# Patient Record
Sex: MALE | Race: WHITE | Employment: FULL TIME | URBAN - METROPOLITAN AREA
[De-identification: names, ages, dates, MRNs, and addresses within clinical notes are randomized per-mention and may not be internally consistent; named-entity substitution may affect disease eponyms.]

---

## 2018-05-10 ENCOUNTER — TELEPHONE (OUTPATIENT)
Dept: SLEEP MEDICINE | Age: 33
End: 2018-05-10

## 2018-05-10 DIAGNOSIS — G47.33 OBSTRUCTIVE SLEEP APNEA (ADULT) (PEDIATRIC): Primary | ICD-10-CM

## 2018-05-10 NOTE — TELEPHONE ENCOUNTER
STUDY ORDER CONFIRMATION  Bartolo Rob 1985      Type of Study Requested: Direct Referral for Study - Please arrange a sleep study consult only if sleep study is positive. Referring Physician: Dr. Florecita Cobb    Date of Study: 07/15/2018  Spoke with: patient          Height: 5f10  Weight: 230lbs  (over 499 lb can only be done at [facility])  BMI:       Snoring      yes    Excessive Daytime Sleepiness   no    Witnessed Apnea     yes    Hypertension      no    Cardiovascular disease (CHF-Heart Failure)  no    COPD or Emphysema?    no  On Oxygen? lpm Day/Night   no    Restless Leg Symptoms-   Do you experience an uncomfortable sensation in your legs   especially at night?    no   Kicking?     no   Twitching?     no    Do you experience insomnia?   no  Sleep talking/walking?     no  Do you ever wake with a rapid heart rate?  no  Unusual movements in sleep (violent, flailing limbs? )no  Night Terrors?      no  Sleep Paralysis or Sleep Hallucinations:  (while waking from sleep or dream, you cannot move) no  Do you/have you had seizures?   no  Have you had a Stroke, CVA or TIA?   no       When? Do you take any medications for the following? Pain       no  Anxiety       yes  Sleep       no               Have you been in hospital?    no   Has it been at least 72 hrs. since discharge? no   Discharge Date   (If not at least 72 hrs, cannot see pt. for study)    Are you currently on an antibiotic?   no  If yes, for what reason? Do you need anything from us for your employer? no    Are you a CDL, DOT or other Certified ? no     Have you had a sleep study before?   no  If yes, when and where? Are you currently using CPAP?   no  If yes, what is the setting? Do you have any special needs?   Wheelchair      no  Help to and from the bathroom   no  Other?      no    (If yes to any special needs a care giver may need to come with the patient and stay the night.)    Will someone need to accompany you on the night of your study? (Primarily for parents of minors, patients with special needs, elderly, long distance travel). Other family,  may stay until study begins. \"We want to be sure to take the best care of you. Are there Cultural or Spiritual needs that we should be aware of or are there any concerns that I can address for you?        no    If yes, describe:      Attach Medication List    If yes to any \"*\" or special needs, discuss with the Lead Tech    Notes:     Study date:07/15/2018  Time:8:30pm Location:[facility]      [facility] [place]    Date: 05/10/2018

## 2018-05-16 NOTE — TELEPHONE ENCOUNTER
STUDY ORDER CONFIRMATION    Study Indication:   G47.33  Study: Diagnostic polysomnogram - CPT 45178: Split Study if patient meets the criteria.   Study Instructions / additional orders:

## 2018-07-15 ENCOUNTER — HOSPITAL ENCOUNTER (OUTPATIENT)
Dept: SLEEP MEDICINE | Age: 33
Discharge: HOME | End: 2018-07-15
Payer: COMMERCIAL

## 2018-07-15 DIAGNOSIS — G47.33 OBSTRUCTIVE SLEEP APNEA (ADULT) (PEDIATRIC): ICD-10-CM

## 2018-07-15 PROCEDURE — 95810 POLYSOM 6/> YRS 4/> PARAM: CPT | Performed by: INTERNAL MEDICINE

## 2018-07-17 ENCOUNTER — TELEPHONE (OUTPATIENT)
Dept: SLEEP MEDICINE | Age: 33
End: 2018-07-17

## 2018-07-19 ENCOUNTER — DOCUMENTATION ONLY (OUTPATIENT)
Dept: SLEEP MEDICINE | Age: 33
End: 2018-07-19

## 2018-07-30 ENCOUNTER — OFFICE VISIT (OUTPATIENT)
Dept: SLEEP MEDICINE | Age: 33
End: 2018-07-30

## 2018-07-30 VITALS
WEIGHT: 254 LBS | HEART RATE: 76 BPM | SYSTOLIC BLOOD PRESSURE: 123 MMHG | HEIGHT: 70 IN | OXYGEN SATURATION: 98 % | DIASTOLIC BLOOD PRESSURE: 76 MMHG | BODY MASS INDEX: 36.36 KG/M2

## 2018-07-30 DIAGNOSIS — E66.01 MORBID OBESITY (HCC): ICD-10-CM

## 2018-07-30 DIAGNOSIS — G47.00 INSOMNIA, UNSPECIFIED TYPE: Primary | ICD-10-CM

## 2018-07-30 DIAGNOSIS — R06.83 PRIMARY SNORING: ICD-10-CM

## 2018-07-30 PROBLEM — F99 PSYCHIATRIC DISORDER: Status: ACTIVE | Noted: 2018-07-30

## 2018-07-30 RX ORDER — VENLAFAXINE HYDROCHLORIDE 37.5 MG/1
CAPSULE, EXTENDED RELEASE ORAL DAILY
COMMUNITY

## 2018-07-30 NOTE — PATIENT INSTRUCTIONS
[address] [place]. [place], [address]  Tel.  [phone]  Fax. [address] [facility] [address]  [place], [address] [facility]  Tel.  [phone]  Fax. [phone] [address]  Tel.  [phone]  Fax. [phone]     Sleep Apnea: After Your Visit  Your Care Instructions  Sleep apnea occurs when you frequently stop breathing for 10 seconds or longer during sleep. It can be mild to severe, based on the number of times per hour that you stop breathing or have slowed breathing. Blocked or narrowed airways in your nose, mouth, or throat can cause sleep apnea. Your airway can become blocked when your throat muscles and tongue relax during sleep. Sleep apnea is common, occurring in 1 out of 20 individuals. Individuals having any of the following characteristics should be evaluated and treated right away due to high risk and detrimental consequences from untreated sleep apnea:  1. Obesity  2. Congestive Heart failure  3. Atrial Fibrillation  4. Uncontrolled Hypertension  5. Type II Diabetes  6. Night-time Arrhythmias  7. Stroke  8. Pulmonary Hypertension  9. High-risk Driving Populations (pilots, truck drivers, etc.)  10. Patients Considering Weight-loss Surgery    How do you know you have sleep apnea? You probably have sleep apnea if you answer 'yes' to 3 or more of the following questions:  S - Have you been told that you Snore? T - Are you often Tired during the day? O - Has anyone Observed you stop breathing while sleeping? P- Do you have (or are being treated for) high blood Pressure? B - Are you obese (Body Mass Index > 35)? A - Is your Age 48years old or older? N - Is your Neck size greater than 16 inches? G - Are you male Gender? A sleep physician can prescribe a breathing device that prevents tissues in the throat from blocking your airway.  Or your doctor may recommend using a dental device (oral breathing device) to help keep your airway open. In some cases, surgery may be needed to remove enlarged tissues in the throat. Follow-up care is a key part of your treatment and safety. Be sure to make and go to all appointments, and call your doctor if you are having problems. It's also a good idea to know your test results and keep a list of the medicines you take. How can you care for yourself at home? · Lose weight, if needed. It may reduce the number of times you stop breathing or have slowed breathing. · Go to bed at the same time every night. · Sleep on your side. It may stop mild apnea. If you tend to roll onto your back, sew a pocket in the back of your pajama top. Put a tennis ball into the pocket, and stitch the pocket shut. This will help keep you from sleeping on your back. · Avoid alcohol and medicines such as sleeping pills and sedatives before bed. · Do not smoke. Smoking can make sleep apnea worse. If you need help quitting, talk to your doctor about stop-smoking programs and medicines. These can increase your chances of quitting for good. · Prop up the head of your bed 4 to 6 inches by putting bricks under the legs of the bed. · Treat breathing problems, such as a stuffy nose, caused by a cold or allergies. · Use a continuous positive airway pressure (CPAP) breathing machine if lifestyle changes do not help your apnea and your doctor recommends it. The machine keeps your airway from closing when you sleep. · If CPAP does not help you, ask your doctor whether you should try other breathing machines. A bilevel positive airway pressure machine has two types of air pressureâone for breathing in and one for breathing out. Another device raises or lowers air pressure as needed while you breathe. · If your nose feels dry or bleeds when using one of these machines, talk with your doctor about increasing moisture in the air. A humidifier may help.   · If your nose is runny or stuffy from using a breathing machine, talk with your doctor about using decongestants or a corticosteroid nasal spray. When should you call for help? Watch closely for changes in your health, and be sure to contact your doctor if:  · You still have sleep apnea even though you have made lifestyle changes. · You are thinking of trying a device such as CPAP. · You are having problems using a CPAP or similar machine. Where can you learn more? Go to [employer]. Enter H813 in the search box to learn more about \"Sleep Apnea: After Your Visit. \"   © 9855-9242 Healthwise, Incorporated. Care instructions adapted under license by [employer] (which disclaims liability or warranty for this information). This care instruction is for use with your licensed healthcare professional. If you have questions about a medical condition or this instruction, always ask your healthcare professional. Jon[employer] Rise any warranty or liability for your use of this information. PROPER SLEEP HYGIENE    What to avoid  · Do not have drinks with caffeine, such as coffee or black tea, for 8 hours before bed. · Do not smoke or use other types of tobacco near bedtime. Nicotine is a stimulant and can keep you awake. · Avoid drinking alcohol late in the evening, because it can cause you to wake in the middle of the night. · Do not eat a big meal close to bedtime. If you are hungry, eat a light snack. · Do not drink a lot of water close to bedtime, because the need to urinate may wake you up during the night. · Do not read or watch TV in bed. Use the bed only for sleeping and sexual activity. What to try  · Go to bed at the same time every night, and wake up at the same time every morning. Do not take naps during the day. · Keep your bedroom quiet, dark, and cool. · Get regular exercise, but not within 3 to 4 hours of your bedtime. .  · Sleep on a comfortable pillow and mattress.   · If watching the clock makes you anxious, turn it facing away from you so you cannot see the time. · If you worry when you lie down, start a worry book. Well before bedtime, write down your worries, and then set the book and your concerns aside. · Try meditation or other relaxation techniques before you go to bed. · If you cannot fall asleep, get up and go to another room until you feel sleepy. Do something relaxing. Repeat your bedtime routine before you go to bed again. · Make your house quiet and calm about an hour before bedtime. Turn down the lights, turn off the TV, log off the computer, and turn down the volume on music. This can help you relax after a busy day. Drowsy Driving  The [address] [place] [address] Road Traffic Safety Administration cites drowsiness as a causing factor in more than 798,767 police reported crashes annually, resulting in 76,000 injuries and 1,500 deaths. Other surveys suggest 55% of people polled have driven while drowsy in the past year, 23% had fallen asleep but not crashed, 3% crashed, and 2% had and accident due to drowsy driving. Who is at risk? Young Drivers: One study of drowsy driving accidents states that 55% of the drivers were under 25 years. Of those, 75% were male. Shift Workers and Travelers: People who work overnight or travel across time zones frequently are at higher risk of experiencing Circadian Rhythm Disorders. They are trying to work and function when their body is programed to sleep. Sleep Deprived: Lack of sleep has a serious impact on your ability to pay attention or focus on a task. Consistently getting less than the average of 8 hours your body needs creates partial or cumulative sleep deprivation. Untreated Sleep Disorders: Sleep Apnea, Narcolepsy, R.L.S., and other sleep disorders (untreated) prevent a person from getting enough restful sleep. This leads to excessive daytime sleepiness and increases the risk for drowsy driving accidents by up to 7 times.   Medications / Alcohol: Even over the counter medications can cause drowsiness. Medications that impair a drivers attention should have a warning label. Alcohol naturally makes you sleepy and on its own can cause accidents. Combined with excessive drowsiness its effects are amplified. Signs of Drowsy Driving:   * You don't remember driving the last few miles   * You may drift out of your hien   * You are unable to focus and your thoughts wander   * You may yawn more often than normal   * You have difficulty keeping your eyes open / nodding off   * Missing traffic signs, speeding, or tailgating  Prevention-   Good sleep hygiene, lifestyle and behavioral choices have the most impact on drowsy driving. There is no substitute for sleep and the average person requires 8 hours nightly. If you find yourself driving drowsy, stop and sleep. Consider the sleep hygiene tips provided during your visit as well. Medication Refill Policy: Refills for all medications require 1 week advance notice. Please have your pharmacy fax a refill request. We are unable to fax, or call in \"controled substance\" medications and you will need to pick these prescriptions up from our office. Ezakus Activation    Thank you for requesting access to Ezakus. Please follow the instructions below to securely access and download your online medical record. Ezakus allows you to send messages to your doctor, view your test results, renew your prescriptions, schedule appointments, and more. How Do I Sign Up? 1. In your internet browser, go to [email or web address]. 2. Click on the First Time User? Click Here link in the Sign In box. You will see the New Member Sign Up page. 3. Enter your Ezakus Access Code exactly as it appears below. You will not need to use this code after youve completed the sign-up process. If you do not sign up before the expiration date, you must request a new code.     Ezakus Access Code: [email or web address]  Expires: 10/11/2018  9:37 AM (This is the date your OneSun access code will )    4. Enter the last four digits of your Social Security Number (xxxx) and Date of Birth (mm/dd/yyyy) as indicated and click Submit. You will be taken to the next sign-up page. 5. Create a [employer] ID. This will be your OneSun login ID and cannot be changed, so think of one that is secure and easy to remember. 6. Create a OneSun password. You can change your password at any time. 7. Enter your Password Reset Question and Answer. This can be used at a later time if you forget your password. 8. Enter your e-mail address. You will receive e-mail notification when new information is available in [address] 9. Click Sign Up. You can now view and download portions of your medical record. 10. Click the Download Summary menu link to download a portable copy of your medical information. Additional Information    If you have questions, please call [phone]. Remember, OneSun is NOT to be used for urgent needs. For medical emergencies, dial 911.

## 2018-07-30 NOTE — PROGRESS NOTES
[address] [place], [address]  Tel.  [phone]  Fax. 100 [facility] [address]  [place], [address] [facility]  Tel.  [phone]  Fax. [phone] [address] [facility] [address]  Tel.  [phone]  Fax. [phone]         Subjective:      Regino Hernandez is an 26 y.o. male referred by Dr. Rod Epps,  for evaluation for a sleep disorder. He complains of snoring, snorting and daytime sleepiness associated with he has been banished to the couch due to his snoring. Symptoms began several years ago, gradually worsening since that time. He usually can fall asleep in 5 minutes. Family or house members note snoring, snorting. He denies falling asleep while at work, driving. Regino Hernandez does wake up frequently at night. He is bothered by waking up too early and left unable to get back to sleep. He actually sleeps about 6 hours at night and wakes up about 6 times during the night. He does not work shifts: Matthew Crawford indicates he does get too little sleep at night. His bedtime is 2200. He awakens at 0500. He does take naps. He takes 1 naps a week lasting 30 to 45, Minute(s). He has the following observed behaviors: Light snoring, Loud snoring, Pauses in breathing; Nightmares. Other remarks: vivid dreams and waking with gasp  At one time he had lost 40 pounds and felt better. He has young twin girls. Significant other bothered by snoring. He tends to fall asleep with tv on   Polysomnogram was performed and the results of the study were explained to the patient. Please refer to interpretation report for further details. Apnea/Hypopnea index of 3 which indicates no significant apnea. He continues to have snoring, excessive daytime sleepiness. [place] Sleepiness Score: 9      Not on File      Current Outpatient Prescriptions:     venlafaxine-SR (EFFEXOR XR) 37.5 mg capsule, Take  by mouth daily. , Disp: , Rfl:      He  has a past medical history of Morbid obesity ([address] and Psychiatric disorder. He  has no past surgical history on file. He family history includes No Known Problems in his father and mother. He  reports that he has never smoked. He has never used smokeless tobacco. He reports that he does not drink alcohol or use illicit drugs. Review of Systems:  Constitutional:  60 pound weight gain. Eyes:  No blurred vision. CVS:  No significant chest pain  Pulm:  No significant shortness of breath  GI:  No significant nausea or vomiting  :  No significant nocturia  Musculoskeletal:  No significant joint pain at night  Skin:  No significant rashes  Neuro:  No significant dizziness   Psych:  Treated for depression and anxiety. He is currently weaning off the effexor with his doctor. Sleep Review of Systems: notable for no difficulty falling asleep; infrequent awakenings at night;  regular dreaming noted; no nightmares ; no early morning headaches; no memory problems; no concentration issues; no history of any automobile or occupational accidents due to daytime drowsiness. Objective:     Visit Vitals    /76    Pulse 76    Ht 5' 10\" (1.778 m)    Wt 254 lb (115.2 kg)    SpO2 98%    BMI 36.45 kg/m2         General:   Not in acute distress   Eyes:  Anicteric sclerae, no obvious strabismus   Nose:  No obvious nasal septum deviation    Oropharynx:   Class 4 oropharyngeal outlet, thick tongue base, enlarged and boggy uvula, low-lying soft palate, narrow tonsilo-pharyngeal pilars   Tonsils:   tonsils are present and normal   Neck:   Neck circ. in \"inches\": 18; midline trachea   Chest/Lungs:  Equal lung expansion, clear on auscultation    CVS:  Normal rate, regular rhythm; no JVD   Skin:  Warm to touch; no obvious rashes   Neuro:  No focal deficits ; no obvious tremor    Psych:  Normal affect,  normal countenance;          Assessment:       ICD-10-CM ICD-9-CM    1. Insomnia, unspecified type G47.00 780.52    2.  Primary snoring R06.83 786.09 3. Morbid obesity ([facility] E66.01 278.01          Plan:       Primary Snoring - he was advised to avoid sleeping in the supine position. Alcohol intake and or sedating medications aggravate snoring and respiratory events and their use should be minimized or avoided. he would benefit from a dedicated weight loss program.  The option of a referral to dentistry for consideration of an oral device to reduce snoring was also reviewed. He will speak to his dentist about making a snore guard. Proper sleep hygiene with emphasis on ensuring sufficient total sleep time and safe driving was reinforced today. All questions were addressed. he was encouraged to call if he has any further questions or concerns regarding our discussion. * He was asked to contact our office at any time for further questions regarding his sleep symptoms. I have advised a regular sleep wake cycle. he  was advised to avoid looking at the clock during the night. Ideally, the clock face should be turned away. he was advised to minimize caffeine use and to avoid caffeine-containing beverages 8 hours prior to bedtime. Naps were discouraged. A regular exercise schedule, at least 3 hours before bedtime, would be beneficial to improving sleep quality. .  Watching TV, using laptops, tablets and smartphones in the evening was discouraged. he  was advised to keep the bedroom cool and dark. .  All of his questions were addressed. * * Counseling was provided regarding proper sleep hygiene and safe driving. 2. Obesity - I have counseled the patient regarding the benefits of weight loss. Thank you for allowing us to participate in your patient's medical care.     Electronically signed by    Sonny Donis MD  Diplomate in Sleep Medicine  [facility]

## 2020-03-20 ENCOUNTER — HOSPITAL ENCOUNTER (OUTPATIENT)
Dept: MRI IMAGING | Age: 35
Discharge: HOME | End: 2020-03-20
Attending: ORTHOPAEDIC SURGERY
Payer: COMMERCIAL

## 2020-03-20 DIAGNOSIS — M23.92 INTERNAL DERANGEMENT OF LEFT KNEE: ICD-10-CM

## 2020-03-20 DIAGNOSIS — M23.92 LOCKING OF LEFT KNEE: ICD-10-CM

## 2020-03-20 PROCEDURE — 73721 MRI JNT OF LWR EXTRE W/O DYE: CPT

## 2021-08-03 PROBLEM — E66.01 MORBID OBESITY (HCC): Status: RESOLVED | Noted: 2018-07-30 | Resolved: 2021-08-03

## 2022-03-19 PROBLEM — F99 PSYCHIATRIC DISORDER: Status: ACTIVE | Noted: 2018-07-30

## 2023-05-10 RX ORDER — VENLAFAXINE HYDROCHLORIDE 37.5 MG/1
CAPSULE, EXTENDED RELEASE ORAL DAILY
COMMUNITY